# Patient Record
Sex: MALE | URBAN - NONMETROPOLITAN AREA
[De-identification: names, ages, dates, MRNs, and addresses within clinical notes are randomized per-mention and may not be internally consistent; named-entity substitution may affect disease eponyms.]

---

## 2019-04-09 ENCOUNTER — IMPORTED ENCOUNTER (OUTPATIENT)
Dept: URBAN - NONMETROPOLITAN AREA CLINIC 1 | Facility: CLINIC | Age: 74
End: 2019-04-09

## 2019-04-09 PROBLEM — H02.125: Noted: 2019-04-09

## 2019-04-09 PROBLEM — H25.13: Noted: 2019-04-09

## 2019-04-09 PROBLEM — H02.122: Noted: 2019-04-09

## 2019-04-09 PROCEDURE — 92002 INTRM OPH EXAM NEW PATIENT: CPT

## 2019-04-09 NOTE — PATIENT DISCUSSION
ectropion l/l oustart maxitrol qhs oustart maxitrol 1 gtt tid oux 14 days then prnCataract(s)-Visually significant.-Cataract(s) causing symptomatic impairment of visual function not correctable with a tolerable change in glasses or contact lenses lighting or non-operative means resulting in specific activity limitations and/or participation restrictions including but not limited to reading viewing television driving or meeting vocational or recreational needs. -Expectation is clearer vision and reduced glare disability after cataract removal.-Refer to Dr Edwina Vigil for cataract evaluationpt to call when ready

## 2020-03-20 NOTE — PROCEDURE NOTE: SURGICAL
<p>Prior to commencing surgery, patient identification, surgical procedure, site, and side were confirmed by Dr. Bull Drake. Following topical proparacaine anesthesia, the patient was positioned at the YAG laser, a contact lens coupled to the cornea with methylcellulose and a peripheral iridotomy placed using 3.0 Mj x 30. without complication. Excess methylcellulose was washed from the eyes, one drop of Econopred Plus 1% instilled and the patient returned to the holding area having tolerated the procedure well and without complication. </p><p>MRN:[id number]</p><p>Need a billable diagnosis </p>

## 2020-03-20 NOTE — PATIENT DISCUSSION
Goal: OD 1st Basic+ jabier; OS 2nd Basic+ jabier. Chemical Peel: 10% TCA Detail Level: Zone Post-Care Instructions: I reviewed with the patient in detail post-care instructions. Patient should avoid sun exposure and wear sun protection. Consent: Prior to the procedure, written consent was obtained and risks were reviewed, including but not limited to: redness, peeling, blistering, pigmentary change, scarring, infection, and pain. Number Of Coats: 2 Post Peel Care: After the procedure, the treatment area was washed with soap and water, and a post-peel cream was applied. Sun protection and post-care instructions were reviewed with the patient. Prep: The treated area was degreased with pre-peel cleanser, and vaseline was applied for protection of mucous membranes. Frost (0,1+,2+,3+,4+): 0

## 2020-04-01 NOTE — PATIENT DISCUSSION
Cataract surgery has been performed in the first eye and activities of daily living are still impaired. The patient would like to proceed with cataract surgery in the second eye as scheduled.

## 2020-04-01 NOTE — PATIENT DISCUSSION
----- Message from Evangelista Bolton RN sent at 8/13/2015 11:15 AM CDT -----  Regarding: Recall Colon  Recall colon in 5 years per GS.  Colon done 10/27/14 Advised to call immediately if any worsening distortion or blurring is noted.

## 2020-04-06 NOTE — PATIENT DISCUSSION
pt loves monovision and would like to aim OS for near goal of -2.00. Disc that CV would more than likely be the case.  will notify [facility] team.

## 2020-05-05 NOTE — PATIENT DISCUSSION
Retinal tear and detachment warning symptoms reviewed and patient instructed to call immediately if increasing floaters, flashes, or decreasing peripheral vision. General

## 2020-06-17 NOTE — PROCEDURE NOTE: CLINICAL
PROCEDURE NOTE: Punctal Plugs, Silicone #1 Left Upper Lid. Prior to treatment, the risks/benefits/alternatives were discussed. The patient wished to proceed with procedure. One drop of proparacaine was placed and a drop of lidocaine gel was placed over the puncta. 0.* mm permanent silicone plugs were inserted in 0.4mm eyelids. Patient tolerated procedure well. There were no complications. Post procedure instructions given. Abelardo Krishna PROCEDURE NOTE: Punctal Plugs, Silicone #1 Right Upper Lid. Prior to treatment, the risks/benefits/alternatives were discussed. The patient wished to proceed with procedure. One drop of proparacaine was placed and a drop of lidocaine gel was placed over the puncta. 0.* mm permanent silicone plugs were inserted in 0.4mm eyelids. Patient tolerated procedure well. There were no complications. Post procedure instructions given. Abelardo Krishna

## 2020-11-24 NOTE — PATIENT DISCUSSION
minimal progression, Recommended against surgery for now given good vision and lack of impact on activities of daily living.

## 2020-11-24 NOTE — PATIENT DISCUSSION
minimal progression, Recommended against surgery at this time given that patient is happy with present vision.

## 2022-04-09 ASSESSMENT — VISUAL ACUITY
OD_PH: 20/25
OS_CC: 20/25+
OD_CC: 20/100

## 2022-04-09 ASSESSMENT — TONOMETRY
OS_IOP_MMHG: 15
OD_IOP_MMHG: 15

## 2022-06-10 NOTE — PROCEDURE NOTE: CLINICAL
PROCEDURE NOTE: Punctal Plugs, Silicone #1 Left Upper Lid. Anesthesia: Topical. Prep: Antibiotic Drops q 5min x 3. Prior to treatment, the risks/benefits/alternatives were discussed. The patient wished to proceed with procedure. One drop of proparacaine was placed and a drop of lidocaine gel was placed over the puncta. 0.5 mm permanent silicone plugs were inserted in YVONNE eyelids. Patient tolerated procedure well. There were no complications. Post procedure instructions given. Rosy Reddy PROCEDURE NOTE: Punctal Plugs, Silicone #1 Right Upper Lid. Anesthesia: Topical. Prep: Antibiotic Drops q 5min x 3. Prior to treatment, the risks/benefits/alternatives were discussed. The patient wished to proceed with procedure. One drop of proparacaine was placed and a drop of lidocaine gel was placed over the puncta. 0.5 mm permanent silicone plugs were inserted in RUL eyelids. Patient tolerated procedure well. There were no complications. Post procedure instructions given. Rosy eRddy

## 2023-03-03 NOTE — PATIENT DISCUSSION
Glasses Prescription given to patient. -WAIT UNTIL AFTER YAG OU--POSS DVO RX. warm and dry/color normal/normal/no rashes/no ulcers warm and dry/color normal/no rashes/wound

## 2023-09-27 NOTE — PROCEDURE NOTE: CLINICAL
PROCEDURE NOTE: Punctal Plugs, Yannickjose  ([id number], [id number]) #1 Left Lower Lid. Prior to treatment, the risks/benefits/alternatives were discussed. The patient wished to proceed with procedure. Temporary collagen plugs were inserted. Patient tolerated procedure well. There were no complications. Post procedure instructions given. Naldo Knox PROCEDURE NOTE: Punctal Plugs, Guillermo Dissolvable ([id number], [id number]) #1 Right Lower Lid. Prior to treatment, the risks/benefits/alternatives were discussed. The patient wished to proceed with procedure. Temporary collagen plugs were inserted. Patient tolerated procedure well. There were no complications. Post procedure instructions given. Naldo Knox Yes